# Patient Record
Sex: FEMALE | Race: WHITE | NOT HISPANIC OR LATINO | URBAN - METROPOLITAN AREA
[De-identification: names, ages, dates, MRNs, and addresses within clinical notes are randomized per-mention and may not be internally consistent; named-entity substitution may affect disease eponyms.]

---

## 2019-11-05 ENCOUNTER — OFFICE (OUTPATIENT)
Dept: URBAN - METROPOLITAN AREA CLINIC 11 | Facility: CLINIC | Age: 64
End: 2019-11-05

## 2019-11-05 VITALS
WEIGHT: 177 LBS | HEART RATE: 67 BPM | DIASTOLIC BLOOD PRESSURE: 76 MMHG | HEIGHT: 65 IN | SYSTOLIC BLOOD PRESSURE: 150 MMHG

## 2019-11-05 DIAGNOSIS — Z86.010 PERSONAL HISTORY OF COLONIC POLYPS: ICD-10-CM

## 2019-11-05 DIAGNOSIS — K21.9 GASTRO-ESOPHAGEAL REFLUX DISEASE WITHOUT ESOPHAGITIS: ICD-10-CM

## 2019-11-05 DIAGNOSIS — R13.19 OTHER DYSPHAGIA: ICD-10-CM

## 2019-11-05 PROCEDURE — 99203 OFFICE O/P NEW LOW 30 MIN: CPT | Performed by: INTERNAL MEDICINE

## 2019-11-05 RX ORDER — POLYETHYLENE GLYCOL 3350, SODIUM SULFATE, SODIUM CHLORIDE, POTASSIUM CHLORIDE, ASCORBIC ACID, SODIUM ASCORBATE 140-9-5.2G
KIT ORAL
Qty: 1 | Refills: 0 | Status: COMPLETED
Start: 2019-11-05 | End: 2019-11-25

## 2019-11-25 ENCOUNTER — AMBULATORY SURGICAL CENTER (OUTPATIENT)
Dept: URBAN - METROPOLITAN AREA SURGERY 3 | Facility: SURGERY | Age: 64
End: 2019-11-25
Payer: COMMERCIAL

## 2019-11-25 ENCOUNTER — OFFICE (OUTPATIENT)
Dept: URBAN - METROPOLITAN AREA PATHOLOGY 22 | Facility: PATHOLOGY | Age: 64
End: 2019-11-25

## 2019-11-25 ENCOUNTER — AMBULATORY SURGICAL CENTER (OUTPATIENT)
Dept: URBAN - METROPOLITAN AREA SURGERY 3 | Facility: SURGERY | Age: 64
End: 2019-11-25

## 2019-11-25 VITALS
OXYGEN SATURATION: 98 % | DIASTOLIC BLOOD PRESSURE: 73 MMHG | RESPIRATION RATE: 17 BRPM | HEART RATE: 64 BPM | TEMPERATURE: 98.9 F | TEMPERATURE: 97.5 F | TEMPERATURE: 97.5 F | SYSTOLIC BLOOD PRESSURE: 123 MMHG | HEIGHT: 65 IN | DIASTOLIC BLOOD PRESSURE: 72 MMHG | HEART RATE: 74 BPM | DIASTOLIC BLOOD PRESSURE: 62 MMHG | HEART RATE: 70 BPM | SYSTOLIC BLOOD PRESSURE: 135 MMHG | DIASTOLIC BLOOD PRESSURE: 66 MMHG | HEART RATE: 63 BPM | RESPIRATION RATE: 20 BRPM | SYSTOLIC BLOOD PRESSURE: 126 MMHG | SYSTOLIC BLOOD PRESSURE: 132 MMHG | WEIGHT: 167 LBS | WEIGHT: 167 LBS | HEART RATE: 65 BPM | SYSTOLIC BLOOD PRESSURE: 120 MMHG | RESPIRATION RATE: 18 BRPM | SYSTOLIC BLOOD PRESSURE: 126 MMHG | SYSTOLIC BLOOD PRESSURE: 126 MMHG | OXYGEN SATURATION: 98 % | TEMPERATURE: 98.9 F | HEART RATE: 64 BPM | HEART RATE: 65 BPM | RESPIRATION RATE: 17 BRPM | OXYGEN SATURATION: 98 % | WEIGHT: 167 LBS | DIASTOLIC BLOOD PRESSURE: 73 MMHG | HEART RATE: 65 BPM | TEMPERATURE: 97.5 F | HEART RATE: 74 BPM | OXYGEN SATURATION: 96 % | RESPIRATION RATE: 18 BRPM | TEMPERATURE: 98.9 F | HEART RATE: 64 BPM | DIASTOLIC BLOOD PRESSURE: 75 MMHG | RESPIRATION RATE: 20 BRPM | SYSTOLIC BLOOD PRESSURE: 135 MMHG | DIASTOLIC BLOOD PRESSURE: 66 MMHG | DIASTOLIC BLOOD PRESSURE: 72 MMHG | SYSTOLIC BLOOD PRESSURE: 132 MMHG | DIASTOLIC BLOOD PRESSURE: 62 MMHG | RESPIRATION RATE: 18 BRPM | SYSTOLIC BLOOD PRESSURE: 135 MMHG | DIASTOLIC BLOOD PRESSURE: 73 MMHG | DIASTOLIC BLOOD PRESSURE: 66 MMHG | SYSTOLIC BLOOD PRESSURE: 120 MMHG | HEART RATE: 74 BPM | HEART RATE: 70 BPM | DIASTOLIC BLOOD PRESSURE: 75 MMHG | SYSTOLIC BLOOD PRESSURE: 123 MMHG | DIASTOLIC BLOOD PRESSURE: 75 MMHG | SYSTOLIC BLOOD PRESSURE: 123 MMHG | HEART RATE: 63 BPM | HEART RATE: 70 BPM | HEIGHT: 65 IN | SYSTOLIC BLOOD PRESSURE: 120 MMHG | OXYGEN SATURATION: 96 % | RESPIRATION RATE: 17 BRPM | DIASTOLIC BLOOD PRESSURE: 62 MMHG | SYSTOLIC BLOOD PRESSURE: 132 MMHG | DIASTOLIC BLOOD PRESSURE: 72 MMHG | HEIGHT: 65 IN | RESPIRATION RATE: 20 BRPM | OXYGEN SATURATION: 96 % | HEART RATE: 63 BPM

## 2019-11-25 DIAGNOSIS — D12.2 BENIGN NEOPLASM OF ASCENDING COLON: ICD-10-CM

## 2019-11-25 DIAGNOSIS — Z86.010 PERSONAL HISTORY OF COLONIC POLYPS: ICD-10-CM

## 2019-11-25 DIAGNOSIS — K21.0 GASTRO-ESOPHAGEAL REFLUX DISEASE WITH ESOPHAGITIS: ICD-10-CM

## 2019-11-25 DIAGNOSIS — R13.19 OTHER DYSPHAGIA: ICD-10-CM

## 2019-11-25 PROCEDURE — 88305 TISSUE EXAM BY PATHOLOGIST: CPT | Performed by: INTERNAL MEDICINE

## 2019-11-25 PROCEDURE — 45380 COLONOSCOPY AND BIOPSY: CPT | Mod: 33 | Performed by: INTERNAL MEDICINE

## 2019-11-25 PROCEDURE — 43248 EGD GUIDE WIRE INSERTION: CPT | Mod: 51 | Performed by: INTERNAL MEDICINE

## 2019-11-25 NOTE — SERVICEHPINOTES
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 She is having significant reflux problems and does not really want to take a proton pump inhibitor. She takes antacids multiple times a day usually Gaviscon. Her weight is been stable over the last few years and she does have a history of endometrial cancer with surgery and radiation internal diagnosed last year and finished in January of this year with treatment. Everything is stable in good for now. She describes along with reflux symptoms dysphagia to solids for the last 3-4 months that occurs a few times a week. There is no food impaction. There is no bleeding or melena. She has noticed an increased frequency of stools per day since her surgery and radiation to about 2 or 3 bowel movements a day but not really diarrhea although occasionally loose. She has no cardiac history and has no chest pain or shortness of breath. We are also going to do surveillance colonoscopy with history of SSA on her last exam 5 years ago.

## 2019-11-25 NOTE — SERVICEHPINOTES
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 She is having significant reflux problems and does not really want to take a proton pump inhibitor. She takes antacids multiple times a day usually Gaviscon. Her weight is been stable over the last few years and she does have a history of endometrial cancer with surgery and radiation internal diagnosed last year and finished in January of this year with treatment. Everything is stable in good for now. She describes along with reflux symptoms dysphagia to solids for the last 3-4 months that occurs a few times a week. There is no food impaction. There is no bleeding or melena. She has noticed an increased frequency of stools per day since her surgery and radiation to about 2 or 3 bowel movements a day but not really diarrhea although occasionally loose. She has no cardiac history and has no chest pain or shortness of breath. We are also going to do surveillance colonoscopy with history of SSA on her last exam 5 years ago.

## 2019-11-25 NOTE — SERVICEHPINOTES
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 She is having significant reflux problems and does not really want to take a proton pump inhibitor. She takes antacids multiple times a day usually Gaviscon. Her weight is been stable over the last few years and she does have a history of endometrial cancer with surgery and radiation internal diagnosed last year and finished in January of this year with treatment. Everything is stable in good for now. She describes along with reflux symptoms dysphagia to solids for the last 3-4 months that occurs a few times a week. There is no food impaction. There is no bleeding or melena. She has noticed an increased frequency of stools per day since her surgery and radiation to about 2 or 3 bowel movements a day but not really diarrhea although occasionally loose. She has no cardiac history and has no chest pain or shortness of breath. We are also going to do surveillance colonoscopy with history of SSA on her last exam 5 years ago.

## 2020-03-03 ENCOUNTER — OFFICE (OUTPATIENT)
Dept: URBAN - METROPOLITAN AREA CLINIC 11 | Facility: CLINIC | Age: 65
End: 2020-03-03

## 2020-03-03 VITALS
DIASTOLIC BLOOD PRESSURE: 66 MMHG | HEIGHT: 65 IN | HEART RATE: 65 BPM | SYSTOLIC BLOOD PRESSURE: 138 MMHG | WEIGHT: 173 LBS

## 2020-03-03 DIAGNOSIS — R13.19 OTHER DYSPHAGIA: ICD-10-CM

## 2020-03-03 DIAGNOSIS — K21.9 GASTRO-ESOPHAGEAL REFLUX DISEASE WITHOUT ESOPHAGITIS: ICD-10-CM

## 2020-03-03 PROCEDURE — 99213 OFFICE O/P EST LOW 20 MIN: CPT | Performed by: INTERNAL MEDICINE

## 2025-04-04 ENCOUNTER — AMBULATORY SURGICAL CENTER (OUTPATIENT)
Dept: URBAN - METROPOLITAN AREA SURGERY 3 | Facility: SURGERY | Age: 70
End: 2025-04-04

## 2025-04-04 ENCOUNTER — AMBULATORY SURGICAL CENTER (OUTPATIENT)
Dept: URBAN - METROPOLITAN AREA SURGERY 3 | Facility: SURGERY | Age: 70
End: 2025-04-04
Payer: COMMERCIAL

## 2025-04-04 ENCOUNTER — OFFICE (OUTPATIENT)
Dept: URBAN - METROPOLITAN AREA PATHOLOGY 12 | Facility: PATHOLOGY | Age: 70
End: 2025-04-04

## 2025-04-04 VITALS
RESPIRATION RATE: 25 BRPM | DIASTOLIC BLOOD PRESSURE: 58 MMHG | DIASTOLIC BLOOD PRESSURE: 59 MMHG | HEART RATE: 70 BPM | SYSTOLIC BLOOD PRESSURE: 103 MMHG | DIASTOLIC BLOOD PRESSURE: 63 MMHG | RESPIRATION RATE: 24 BRPM | DIASTOLIC BLOOD PRESSURE: 61 MMHG | SYSTOLIC BLOOD PRESSURE: 101 MMHG | HEART RATE: 93 BPM | DIASTOLIC BLOOD PRESSURE: 58 MMHG | SYSTOLIC BLOOD PRESSURE: 129 MMHG | RESPIRATION RATE: 23 BRPM | OXYGEN SATURATION: 94 % | HEART RATE: 68 BPM | HEIGHT: 65 IN | DIASTOLIC BLOOD PRESSURE: 74 MMHG | HEART RATE: 67 BPM | DIASTOLIC BLOOD PRESSURE: 61 MMHG | HEART RATE: 68 BPM | SYSTOLIC BLOOD PRESSURE: 113 MMHG | RESPIRATION RATE: 25 BRPM | DIASTOLIC BLOOD PRESSURE: 59 MMHG | HEART RATE: 67 BPM | DIASTOLIC BLOOD PRESSURE: 68 MMHG | SYSTOLIC BLOOD PRESSURE: 144 MMHG | HEART RATE: 71 BPM | OXYGEN SATURATION: 92 % | RESPIRATION RATE: 23 BRPM | RESPIRATION RATE: 27 BRPM | SYSTOLIC BLOOD PRESSURE: 101 MMHG | SYSTOLIC BLOOD PRESSURE: 112 MMHG | RESPIRATION RATE: 24 BRPM | RESPIRATION RATE: 20 BRPM | HEART RATE: 75 BPM | SYSTOLIC BLOOD PRESSURE: 103 MMHG | SYSTOLIC BLOOD PRESSURE: 113 MMHG | HEART RATE: 71 BPM | HEART RATE: 75 BPM | WEIGHT: 169.8 LBS | SYSTOLIC BLOOD PRESSURE: 129 MMHG | HEART RATE: 93 BPM | TEMPERATURE: 97.4 F | DIASTOLIC BLOOD PRESSURE: 63 MMHG | HEART RATE: 86 BPM | TEMPERATURE: 98.2 F | RESPIRATION RATE: 20 BRPM | TEMPERATURE: 97.4 F | DIASTOLIC BLOOD PRESSURE: 68 MMHG | DIASTOLIC BLOOD PRESSURE: 74 MMHG | HEART RATE: 70 BPM | SYSTOLIC BLOOD PRESSURE: 112 MMHG | WEIGHT: 169.8 LBS | SYSTOLIC BLOOD PRESSURE: 144 MMHG | OXYGEN SATURATION: 92 % | TEMPERATURE: 98.2 F | OXYGEN SATURATION: 94 % | HEIGHT: 65 IN | HEART RATE: 86 BPM | RESPIRATION RATE: 27 BRPM

## 2025-04-04 DIAGNOSIS — Z09 ENCOUNTER FOR FOLLOW-UP EXAMINATION AFTER COMPLETED TREATMEN: ICD-10-CM

## 2025-04-04 DIAGNOSIS — K29.50 UNSPECIFIED CHRONIC GASTRITIS WITHOUT BLEEDING: ICD-10-CM

## 2025-04-04 DIAGNOSIS — K31.89 OTHER DISEASES OF STOMACH AND DUODENUM: ICD-10-CM

## 2025-04-04 DIAGNOSIS — K21.00 GASTRO-ESOPHAGEAL REFLUX DISEASE WITH ESOPHAGITIS, WITHOUT B: ICD-10-CM

## 2025-04-04 DIAGNOSIS — K22.2 ESOPHAGEAL OBSTRUCTION: ICD-10-CM

## 2025-04-04 DIAGNOSIS — R13.19 OTHER DYSPHAGIA: ICD-10-CM

## 2025-04-04 DIAGNOSIS — D12.4 BENIGN NEOPLASM OF DESCENDING COLON: ICD-10-CM

## 2025-04-04 DIAGNOSIS — K22.89 OTHER SPECIFIED DISEASE OF ESOPHAGUS: ICD-10-CM

## 2025-04-04 DIAGNOSIS — Z86.0101 PERSONAL HISTORY OF ADENOMATOUS AND SERRATED COLON POLYPS: ICD-10-CM

## 2025-04-04 DIAGNOSIS — K44.9 DIAPHRAGMATIC HERNIA WITHOUT OBSTRUCTION OR GANGRENE: ICD-10-CM

## 2025-04-04 PROBLEM — K63.5 POLYP OF COLON: Status: ACTIVE | Noted: 2025-04-04

## 2025-04-04 PROBLEM — K20.80 OTHER ESOPHAGITIS WITHOUT BLEEDING: Status: ACTIVE | Noted: 2025-04-04

## 2025-04-04 PROBLEM — K29.70 GASTRITIS, UNSPECIFIED, WITHOUT BLEEDING: Status: ACTIVE | Noted: 2025-04-04

## 2025-04-04 PROCEDURE — 45380 COLONOSCOPY AND BIOPSY: CPT | Performed by: STUDENT IN AN ORGANIZED HEALTH CARE EDUCATION/TRAINING PROGRAM

## 2025-04-04 PROCEDURE — 88305 TISSUE EXAM BY PATHOLOGIST: CPT | Performed by: PATHOLOGY

## 2025-04-04 PROCEDURE — 43239 EGD BIOPSY SINGLE/MULTIPLE: CPT | Mod: 59 | Performed by: STUDENT IN AN ORGANIZED HEALTH CARE EDUCATION/TRAINING PROGRAM

## 2025-04-04 PROCEDURE — 43248 EGD GUIDE WIRE INSERTION: CPT | Mod: 51 | Performed by: STUDENT IN AN ORGANIZED HEALTH CARE EDUCATION/TRAINING PROGRAM

## 2025-04-04 RX ORDER — PANTOPRAZOLE SODIUM 40 MG/1
40 TABLET, DELAYED RELEASE ORAL
Qty: 30 | Refills: 3 | Status: ACTIVE
Start: 2025-04-04

## 2025-04-04 NOTE — SERVICEHPINOTES
Arlet Guevara is a 69 year old  White female with a PMH significant for  GERD, endometrial cancer, and colon polyp  who initially presented to [facility] for evaluation of  surveillance colonoscopy and dysphagia. Clinic Visit 10/25/2024 :brThe patient who was previously followed by Dr. Wray,  presents for her 5 year recall. Her last colonoscopy in 2019 revealed one SSA. She has small, loose bowel movements three to four times daily, often within three to four hours, with a sensation of incomplete evacuation. Periodic rectal bleeding occurs a couple of times a week on the toilet paper, but no black tarry stools. She attributes this to an external hemorrhoid she has. No abdominal pain, nausea, vomiting, or unintentional weight loss. She experiences dysphagia a couple of times a week, describing a sensation of food getting stuck in the lower esophagus, which is alleviated by drinking liquids. There is no vomiting related to this issue. She has a history of GERD and experiences periodic acid reflux. Symptoms are managed with Tums as needed. 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 An EGD in 2019 resulted in dilation, but esophageal biopsies were normal. She uses ibuprofen for joint pain approximately once a week. There is no history of abdominal surgeries and no family history of colon cancer. She does not smoke and consumes about five alcoholic drinks per week. She is not on any anticoagulants. 
br
br   EGD/colonoscopy 4/4/25:  
br
not on blood thinners, last colon in 2019 with 1 SSA, had EGD in 2019 with dilation, PSH includes robotic hysterectomy, no FHx of colon cancers. Has dysphagia, not on PPI, last Bm was clear.

## 2025-04-04 NOTE — SERVICEHPINOTES
Arlet Guevara is a 69 year old  White female with a PMH significant for  GERD, endometrial cancer, and colon polyp  who initially presented to [facility] for evaluation of  surveillance colonoscopy and dysphagia. Clinic Visit 10/25/2024 :brThe patient who was previously followed by Dr. Wray,  presents for her 5 year recall. Her last colonoscopy in 2019 revealed one SSA. She has small, loose bowel movements three to four times daily, often within three to four hours, with a sensation of incomplete evacuation. Periodic rectal bleeding occurs a couple of times a week on the toilet paper, but no black tarry stools. She attributes this to an external hemorrhoid she has. No abdominal pain, nausea, vomiting, or unintentional weight loss. She experiences dysphagia a couple of times a week, describing a sensation of food getting stuck in the lower esophagus, which is alleviated by drinking liquids. There is no vomiting related to this issue. She has a history of GERD and experiences periodic acid reflux. Symptoms are managed with Tums as needed. 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 An EGD in 2019 resulted in dilation, but esophageal biopsies were normal. She uses ibuprofen for joint pain approximately once a week. There is no history of abdominal surgeries and no family history of colon cancer. She does not smoke and consumes about five alcoholic drinks per week. She is not on any anticoagulants. 
br
br   EGD/colonoscopy 4/4/25:  
br
not on blood thinners, last colon in 2019 with 1 SSA, had EGD in 2019 with dilation, PSH includes robotic hysterectomy, no FHx of colon cancers. Has dysphagia, not on PPI, last Bm was clear.

## 2025-04-08 LAB
GASTRO ONE PATHOLOGY: PDF REPORT: (no result)
GASTRO ONE PATHOLOGY: PDF REPORT: (no result)